# Patient Record
Sex: MALE | Race: WHITE | URBAN - METROPOLITAN AREA
[De-identification: names, ages, dates, MRNs, and addresses within clinical notes are randomized per-mention and may not be internally consistent; named-entity substitution may affect disease eponyms.]

---

## 2017-08-29 ENCOUNTER — IMPORTED ENCOUNTER (OUTPATIENT)
Dept: URBAN - METROPOLITAN AREA CLINIC 50 | Facility: CLINIC | Age: 67
End: 2017-08-29

## 2017-08-30 ENCOUNTER — IMPORTED ENCOUNTER (OUTPATIENT)
Dept: URBAN - METROPOLITAN AREA CLINIC 50 | Facility: CLINIC | Age: 67
End: 2017-08-30

## 2017-08-30 NOTE — PATIENT DISCUSSION
"""Annual Type 2 Diabetic eye exam with dilation. Mild diabetic retinopathy found. Bilateral macular edema is not present. Recommend annual dilated examinations. Patient instructed to call office immediately if sudden changes in vision occur. Emphasized importance of good blood glucose control. Summary of care provided to the physician managing the ongoing diabetes care. """

## 2017-10-09 ENCOUNTER — IMPORTED ENCOUNTER (OUTPATIENT)
Dept: URBAN - METROPOLITAN AREA CLINIC 50 | Facility: CLINIC | Age: 67
End: 2017-10-09

## 2017-10-18 ENCOUNTER — IMPORTED ENCOUNTER (OUTPATIENT)
Dept: URBAN - METROPOLITAN AREA CLINIC 50 | Facility: CLINIC | Age: 67
End: 2017-10-18

## 2017-11-08 ENCOUNTER — IMPORTED ENCOUNTER (OUTPATIENT)
Dept: URBAN - METROPOLITAN AREA CLINIC 50 | Facility: CLINIC | Age: 67
End: 2017-11-08

## 2017-11-08 NOTE — PATIENT DISCUSSION
"""Recommend cataract extraction with intraocular lens (IOL) OD.  Discussed the RBAs mentioned the loss part/all of vision with surgery answered patient questions and offered patient copy of consent form."" ""Phaco with IOL OD: 11/28/2017 Sensar AAB00 +22.50 Tri-Moxi

## 2017-11-27 ENCOUNTER — IMPORTED ENCOUNTER (OUTPATIENT)
Dept: URBAN - METROPOLITAN AREA CLINIC 50 | Facility: CLINIC | Age: 67
End: 2017-11-27

## 2017-12-06 ENCOUNTER — IMPORTED ENCOUNTER (OUTPATIENT)
Dept: URBAN - METROPOLITAN AREA CLINIC 50 | Facility: CLINIC | Age: 67
End: 2017-12-06

## 2017-12-13 ENCOUNTER — IMPORTED ENCOUNTER (OUTPATIENT)
Dept: URBAN - METROPOLITAN AREA CLINIC 50 | Facility: CLINIC | Age: 67
End: 2017-12-13

## 2017-12-20 ENCOUNTER — IMPORTED ENCOUNTER (OUTPATIENT)
Dept: URBAN - METROPOLITAN AREA CLINIC 50 | Facility: CLINIC | Age: 67
End: 2017-12-20

## 2018-01-17 ENCOUNTER — IMPORTED ENCOUNTER (OUTPATIENT)
Dept: URBAN - METROPOLITAN AREA CLINIC 50 | Facility: CLINIC | Age: 68
End: 2018-01-17

## 2018-04-23 NOTE — PROCEDURE NOTE: CLINICAL
PROCEDURE NOTE: Excision Chalazion, Single Right Upper Lid. Diagnosis: Chalazion. Prior to treatment, the risks/benefits/alternatives were discussed. The patient wished to proceed with procedure. After the risks, benefits, and alternatives to the procedure were discussed with the patient, informed consent was obtained. The patient, the procedure, and the correct site were identified beforehand. Local anesthesia was applied and the lid was prepped. The lid was clamped exposing the posterior surface on the eyelid. The chalazion was incised and removed with a curette. Bleeding was controlled and the clamp was removed. The patient tolerated the procedure well and left the room in good condition. Post procedure instructions given. Shabana Pollen PROCEDURE NOTE: Chalazion Injection Right Upper Lid. Diagnosis: Chalazion. Anesthesia: Local. Prep: Betadine Flush. Prior to treatment, the risks/benefits/alternatives were discussed. The patient wished to proceed with procedure. Site of Injection: *. Kenalog *units injected. Patient tolerated procedure well. There were no complications. Post procedure instructions given. Shabana Pollen

## 2018-11-07 ENCOUNTER — IMPORTED ENCOUNTER (OUTPATIENT)
Dept: URBAN - METROPOLITAN AREA CLINIC 50 | Facility: CLINIC | Age: 68
End: 2018-11-07

## 2019-03-21 NOTE — PATIENT DISCUSSION
Recommended warm compresses using sustained moist heat for at least 10 minutes a few times daily.  Continue Tobradex gtts TID OU x 1 more week then stop.  Cont Chon mavis prn. Start Doxy 100 mg PO BID x 2 weeks. If not improved on follow up, recommend I&D.

## 2019-03-21 NOTE — PATIENT DISCUSSION
Discussed the use of warm compresses using sustained moist heat as often as possible.  Continue Tobradex gtts TID for 1 more week then stop. Stop Chon Darrius.  Discussed use of high quality fish oils like Nordic Naturals or PRN, 2 grams daily to help normalize miebomian glands which will help with dry eyes as well as getting chronic chalazia.   Can also try Blephadex or SteriLid or Ocusoft lid cleansers daily.  Doxycycline used in past, recommend restart Doxy 100mg po BID x 2weeks. Follow up 1 month, s/p Kenalog injection with KK sooner prn.

## 2019-03-21 NOTE — PATIENT DISCUSSION
Kenalog injection recommended. Discussed with patient steroid injection may leave white steroid deposit visibly on eyelid, will resolve with time.

## 2019-03-21 NOTE — PROCEDURE NOTE: CLINICAL
PROCEDURE NOTE: Chalazion Injection Right Upper Lid. Diagnosis: Chalazion. Prior to treatment, the risks/benefits/alternatives were discussed. The patient wished to proceed with procedure. Site of Injection: *. Kenalog *units injected. Patient tolerated procedure well. There were no complications. Post procedure instructions given. Lalitae Rea PROCEDURE NOTE: Chalazion Injection Left Lower Lid. Diagnosis: Chalazion. Prior to treatment, the risks/benefits/alternatives were discussed. The patient wished to proceed with procedure. Site of Injection: *. Kenalog *units injected. Patient tolerated procedure well. There were no complications. Post procedure instructions given. Teresa Lucia

## 2019-04-18 NOTE — PATIENT DISCUSSION
Recommend biopsy of eyelid margin in future if redness does not fully resolve . Patient defers treatment today, would like to monitor.

## 2019-04-18 NOTE — PROCEDURE NOTE: CLINICAL
PROCEDURE NOTE: Chalazion Injection Left Lower Lid. Diagnosis: Chalazion. Prior to treatment, the risks/benefits/alternatives were discussed. The patient wished to proceed with procedure. Site of Injection: *. Kenalog *units injected. Patient tolerated procedure well. There were no complications. Post procedure instructions given. Yris Horowitz

## 2019-05-07 NOTE — PROCEDURE NOTE: CLINICAL
PROCEDURE NOTE: Excision Chalazion, Single Left Lower Lid. Diagnosis: Chalazion. Prior to treatment, the risks/benefits/alternatives were discussed. The patient wished to proceed with procedure. After the risks, benefits, and alternatives to the procedure were discussed with the patient, informed consent was obtained. The patient, the procedure, and the correct site were identified beforehand. Local anesthesia was applied and the lid was prepped. The lid was clamped exposing the posterior surface on the eyelid. The chalazion was incised and removed with a curette. Bleeding was controlled and the clamp was removed. The patient tolerated the procedure well and left the room in good condition. Post procedure instructions given. Jack Ortiz

## 2019-05-07 NOTE — PATIENT DISCUSSION
Patient desires excision of chalazion today.  Continue 2 grams of Nordic Naturals daily, may take up to 10 weeks to notice a difference.  Recommend using a reheatable warm moist compress for at least 10 minutes everyday to help liquify the oil glands and to decrease  recurrence of chalazia; followed by a  GENTLE lid massage along eyelash margins, with eyes closed.  No scrubbing.    Recommend stop baby shampoo.  Use blephadex or steri lid cleansers, which are gentle and made specifically for  our eyes.  Maxitrol drops OS QID x  1 wk then stop.  Chon  mavis qhs OS x 2 wks then stop. Follow up prn.

## 2019-05-07 NOTE — PATIENT DISCUSSION
Bump is still pink, has decreased greatly in size. Still has white appearance on the lid margin- pt states this is from a biopsy done years ago.  No treatment recommended today.

## 2019-05-09 NOTE — PATIENT DISCUSSION
Recommend  at least 2cc's of Voluma  to start (discussed risks and benefits. .. ).   1 syringe is $800.00,  if used one per side it would be $700.00.    May need  a good amount of product to get the lift she desires.  Discussed face lift would be a permanent and better option.  Patient  is not interested.  Discussed conservative approach today due to festoon left side d/t recent chalazion.  Patient defers treatment due to costs.  [place] will [place] her the $100.00 consult fee she paid.  Follow up prn.

## 2019-05-09 NOTE — PATIENT DISCUSSION
Recommended at least 1-2 syringes of restylane/juvederm to start.   Patient defers treatment today due to cost.

## 2019-11-12 NOTE — PATIENT DISCUSSION
Bump is still pink, has decreased greatly in size. Still has white appearance on the lid margin- pt states this is from a biopsy done years ago.  No treatment recommended today.
Continue current management.
Continue maxitrol drops qid x 1 week.
Discussed Restasis/Xiidra/Plugs.
Discussed condition and exacerbating conditions/situations (e.g., dry/arid environments, overhead fans, air conditioners, side effect of medications).
Discussed lid hygiene, warm compress and eyelid wash.
Discussed the use of warm compresses.
Doxyclycline PO.
I&D and kenalog injection performed today. Pt tolerated procedure well.
If chalazion worsens, patient advised to return to clinic.
Monitor.
PCO symptoms reviewed: blur, glare, difficulty with reading, driving, ADL.
Patient desires excision of chalazion today.  Continue 2 grams of Nordic Naturals daily, may take up to 10 weeks to notice a difference.  Recommend using a reheatable warm moist compress for at least 10 minutes everyday to help liquify the oil glands and to decrease  recurrence of chalazia; followed by a  GENTLE lid massage along eyelash margins, with eyes closed.  No scrubbing.    Recommend stop baby shampoo.  Use blephadex or steri lid cleansers, which are gentle and made specifically for  our eyes.  Maxitrol drops OS QID x  1 wk then stop.  Chon  mavis qhs OS x 2 wks then stop. Follow up prn.
Patient tolerated procedure well.
Recommended artificial tears to use: 1 drop 4x a day in both eyes.
Recommended at least 1-2 syringes of restylane/juvederm to start.   Patient defers treatment today due to cost.
Recommend  at least 2cc's of Voluma  to start (discussed risks and benefits. .. ).   1 syringe is $800.00,  if used one per side it would be $700.00.    May need  a good amount of product to get the lift she desires.  Discussed face lift would be a permanent and better option.  Patient  is not interested.  Discussed conservative approach today due to festoon left side d/t recent chalazion.  Patient defers treatment due to costs.  [place]   [place] will refund her the $100.00 consult fee she paid.  Follow up prn.
Surgical vs nonsurgical treatment options were discussed.
The patient elects to proceed with surgical excision.
Tobradex tid OD.
Risks/benefits discussed with patient/surrogate

## 2020-09-09 ENCOUNTER — OFFICE (OUTPATIENT)
Dept: URBAN - METROPOLITAN AREA CLINIC 12 | Facility: CLINIC | Age: 70
End: 2020-09-09

## 2020-09-09 VITALS
WEIGHT: 142 LBS | HEART RATE: 63 BPM | SYSTOLIC BLOOD PRESSURE: 122 MMHG | DIASTOLIC BLOOD PRESSURE: 63 MMHG | HEIGHT: 68 IN | OXYGEN SATURATION: 96 %

## 2020-09-09 DIAGNOSIS — Z12.11 ENCOUNTER FOR SCREENING FOR MALIGNANT NEOPLASM OF COLON: ICD-10-CM

## 2020-09-09 RX ORDER — POLYETHYLENE GLYCOL 3350, SODIUM SULFATE, SODIUM CHLORIDE, POTASSIUM CHLORIDE, ASCORBIC ACID, SODIUM ASCORBATE 140-9-5.2G
KIT ORAL
Qty: 1 | Refills: 0 | Status: COMPLETED
Start: 2020-09-09 | End: 2020-09-16

## 2020-09-16 ENCOUNTER — AMBULATORY SURGICAL CENTER (OUTPATIENT)
Dept: URBAN - METROPOLITAN AREA SURGERY 2 | Facility: SURGERY | Age: 70
End: 2020-09-16

## 2020-09-16 ENCOUNTER — AMBULATORY SURGICAL CENTER (OUTPATIENT)
Dept: URBAN - METROPOLITAN AREA SURGERY 2 | Facility: SURGERY | Age: 70
End: 2020-09-16
Payer: MEDICARE

## 2020-09-16 ENCOUNTER — OFFICE (OUTPATIENT)
Dept: URBAN - METROPOLITAN AREA PATHOLOGY 22 | Facility: PATHOLOGY | Age: 70
End: 2020-09-16

## 2020-09-16 VITALS
TEMPERATURE: 98.3 F | HEART RATE: 75 BPM | SYSTOLIC BLOOD PRESSURE: 111 MMHG | TEMPERATURE: 99.3 F | HEART RATE: 75 BPM | SYSTOLIC BLOOD PRESSURE: 86 MMHG | HEART RATE: 94 BPM | DIASTOLIC BLOOD PRESSURE: 62 MMHG | HEART RATE: 77 BPM | SYSTOLIC BLOOD PRESSURE: 84 MMHG | WEIGHT: 145 LBS | SYSTOLIC BLOOD PRESSURE: 131 MMHG | HEIGHT: 68 IN | HEART RATE: 94 BPM | HEART RATE: 94 BPM | SYSTOLIC BLOOD PRESSURE: 84 MMHG | WEIGHT: 145 LBS | DIASTOLIC BLOOD PRESSURE: 58 MMHG | DIASTOLIC BLOOD PRESSURE: 49 MMHG | SYSTOLIC BLOOD PRESSURE: 111 MMHG | OXYGEN SATURATION: 100 % | RESPIRATION RATE: 16 BRPM | TEMPERATURE: 98.3 F | RESPIRATION RATE: 16 BRPM | HEART RATE: 63 BPM | DIASTOLIC BLOOD PRESSURE: 62 MMHG | SYSTOLIC BLOOD PRESSURE: 131 MMHG | DIASTOLIC BLOOD PRESSURE: 49 MMHG | DIASTOLIC BLOOD PRESSURE: 76 MMHG | SYSTOLIC BLOOD PRESSURE: 84 MMHG | HEART RATE: 75 BPM | OXYGEN SATURATION: 100 % | OXYGEN SATURATION: 99 % | DIASTOLIC BLOOD PRESSURE: 76 MMHG | OXYGEN SATURATION: 99 % | HEART RATE: 63 BPM | HEART RATE: 77 BPM | OXYGEN SATURATION: 100 % | HEIGHT: 68 IN | SYSTOLIC BLOOD PRESSURE: 131 MMHG | TEMPERATURE: 99.3 F | OXYGEN SATURATION: 99 % | WEIGHT: 145 LBS | RESPIRATION RATE: 16 BRPM | SYSTOLIC BLOOD PRESSURE: 111 MMHG | HEART RATE: 77 BPM | HEART RATE: 63 BPM | TEMPERATURE: 99.3 F | DIASTOLIC BLOOD PRESSURE: 49 MMHG | SYSTOLIC BLOOD PRESSURE: 86 MMHG | HEIGHT: 68 IN | DIASTOLIC BLOOD PRESSURE: 58 MMHG | DIASTOLIC BLOOD PRESSURE: 76 MMHG | SYSTOLIC BLOOD PRESSURE: 86 MMHG | TEMPERATURE: 98.3 F | DIASTOLIC BLOOD PRESSURE: 58 MMHG | DIASTOLIC BLOOD PRESSURE: 62 MMHG

## 2020-09-16 DIAGNOSIS — Z12.11 ENCOUNTER FOR SCREENING FOR MALIGNANT NEOPLASM OF COLON: ICD-10-CM

## 2020-09-16 DIAGNOSIS — K57.30 DIVERTICULOSIS OF LARGE INTESTINE WITHOUT PERFORATION OR ABS: ICD-10-CM

## 2020-09-16 DIAGNOSIS — D12.3 BENIGN NEOPLASM OF TRANSVERSE COLON: ICD-10-CM

## 2020-09-16 PROBLEM — K63.5 POLYP OF COLON: Status: ACTIVE | Noted: 2020-09-16

## 2020-09-16 PROCEDURE — G8918 PT W/O PREOP ORDER IV AB PRO: HCPCS | Performed by: INTERNAL MEDICINE

## 2020-09-16 PROCEDURE — 45380 COLONOSCOPY AND BIOPSY: CPT | Mod: PT | Performed by: INTERNAL MEDICINE

## 2020-09-16 PROCEDURE — 88305 TISSUE EXAM BY PATHOLOGIST: CPT | Performed by: INTERNAL MEDICINE

## 2020-09-16 PROCEDURE — G8907 PT DOC NO EVENTS ON DISCHARG: HCPCS | Performed by: INTERNAL MEDICINE

## 2021-04-18 ASSESSMENT — VISUAL ACUITY
OD_BAT: >20/400
OD_OTHER: >20/400.
OS_BAT: 20/70
OS_SC: 20/50-2+2
OS_SC: 20/40+1
OD_OTHER: >20/400.
OD_BAT: >20/400
OS_CC: 20/50
OS_CC: 20/40
OD_SC: 20/30-2
OS_SC: 20/40
OD_SC: 20/40+2
OD_SC: 20/50-1
OD_CC: 20/50-1
OS_BAT: 20/70
OS_OTHER: 20/70. 20/200.
OD_CC: 20/60+2
OS_OTHER: 20/70. 20/70.
OS_OTHER: 20/70. 20/70.
OD_SC: 20/30-2
OS_PH: 20/40
OS_PH: 20/30-1
OS_CC: 20/50+1
OD_PH: 20/50
OS_BAT: 20/70

## 2021-04-18 ASSESSMENT — TONOMETRY
OD_IOP_MMHG: 19
OS_IOP_MMHG: 16
OD_IOP_MMHG: 16
OD_IOP_MMHG: 16
OD_IOP_MMHG: 28
OS_IOP_MMHG: 21
OS_IOP_MMHG: 20
OS_IOP_MMHG: 23
OS_IOP_MMHG: 19
OS_IOP_MMHG: 18
OD_IOP_MMHG: 21
OD_IOP_MMHG: 19

## 2021-04-21 NOTE — PROCEDURE NOTE: CLINICAL
PROCEDURE NOTE: Epilation #1 Left Upper Lid. Diagnosis: Trichiasis without Entropion. Anesthesia: Topical. Prior to treatment, the risks/benefits/alternatives were discussed. The patient wished to proceed with procedure. Aberrant lashes removed from * lid(s) using microforcep. Patient tolerated procedure well. There were no complications. Post-op instructions given. Lefty Chamberlain

## 2022-02-26 NOTE — PATIENT DISCUSSION
NEPHROLOGY PROGRESS NOTE      SUBJECTIVE     Follow-up of [facility] on hemodialysis. Tunneled catheter has been discontinued as renal function is improved. Urine output excellent. On p.o. Bumex. Lower extremity swelling improving. Overall feels fine. Reported no chest pain shortness of breath. Renal function stable. Last creatinine 2.17      OBJECTIVE     Vitals:    02/25/22 2005 02/26/22 0528 02/26/22 0830 02/26/22 1221   BP: (!) 148/66  (!) 164/80 (!) 151/60   Pulse: 81  80 76   Resp: 20  16 18   Temp: 98.5 °F (36.9 °C)  97.3 °F (36.3 °C) 98.4 °F (36.9 °C)   TempSrc: Oral  Oral Oral   SpO2: 97%  97% 97%   Weight:  264 lb 11.2 oz (120.1 kg)     Height:         24HR INTAKE/OUTPUT:    No intake or output data in the 24 hours ending 02/26/22 1517    General appearance:Awake, alert, in no acute distress  HEENT: PERRLA  Respiratory::vesicular breath sounds,no wheeze/crackles  Cardiovascular:S1 S2 normal,no gallop or organic murmur. Abdomen:Non tender/non distended. Bowel sounds present  Extremities: No Cyanosis or Clubbing, present lower extremity edema and improving  Neurological:Alert and oriented. No abnormalities of mood, affect, memory, mentation, or behavior are noted      MEDICATIONS     Scheduled Meds:    [START ON 2/27/2022] bumetanide  1 mg Oral Daily    docusate sodium  100 mg Oral Daily    famotidine  20 mg Oral Daily    carvedilol  12.5 mg Oral BID    amLODIPine  10 mg Oral Daily    lisinopril  40 mg Oral Daily    DULoxetine  60 mg Oral Daily    insulin glargine  30 Units SubCUTAneous BID    fluticasone  1 spray Each Nostril Daily    budesonide-formoterol  2 puff Inhalation BID    montelukast  10 mg Oral Daily    rOPINIRole  4 mg Oral Daily    heparin (porcine)  5,000 Units SubCUTAneous 3 times per day    acetaminophen  1,000 mg Oral 3 times per day     Continuous Infusions:     PRN Meds:  heparin (porcine), heparin (porcine), ondansetron, hydrALAZINE, melatonin, albumin human, albuterol Tobradex gtts qid OD for 5ml. sulfate HFA  Home Meds:                Medications Prior to Admission: cyanocobalamin 1000 MCG/ML injection, Inject 1,000 mcg into the muscle every 30 days  fluticasone-salmeterol (ADVAIR HFA) 45-21 MCG/ACT inhaler, Inhale 2 puffs into the lungs 2 times daily  Multiple Vitamins-Minerals (MULTIVITAMIN ADULT EXTRA C PO), Take 1 tablet by mouth daily  vitamin D (ERGOCALCIFEROL) 1.25 MG (08539 UT) CAPS capsule, Take 50,000 Units by mouth once a week  albuterol sulfate HFA (PROAIR HFA) 108 (90 Base) MCG/ACT inhaler, Inhale 1 puff into the lungs as needed  vitamin D (CHOLECALCIFEROL) 125 MCG (5000 UT) CAPS capsule, Take 5,000 Units by mouth daily  DULoxetine (CYMBALTA) 60 MG extended release capsule, Take 60 mg by mouth daily  fenofibrate (TRIGLIDE) 160 MG tablet, Take 160 mg by mouth daily  fluticasone (FLONASE) 50 MCG/ACT nasal spray, 1 spray by Each Nostril route  glucagon 1 MG injection, Inject 1 mg into the muscle as needed  lisinopril (PRINIVIL;ZESTRIL) 40 MG tablet, Take 40 mg by mouth daily  montelukast (SINGULAIR) 10 MG tablet, Take 10 mg by mouth daily  omeprazole (PRILOSEC) 20 MG delayed release capsule, Take 20 mg by mouth daily  rOPINIRole (REQUIP) 4 MG tablet, Take 4 mg by mouth daily  [DISCONTINUED] PRENATAL VIT-DOCUSATE-IRON-FA PO, Take 1 tablet by mouth daily  [DISCONTINUED] carvedilol (COREG) 25 MG tablet, Take 25 mg by mouth daily    INVESTIGATIONS     Last 3 CMP:    Recent Labs     02/24/22  0806 02/25/22  0417 02/26/22  0633    141 141   K 4.7 4.1 4.3   CL 96* 100 102   CO2 27 30 26   BUN 19 20 22*   CREATININE 2.18* 2.45* 2.17*   CALCIUM 9.0 8.7 8.5*       Last 3 CBC:  No results for input(s): WBC, RBC, HGB, HCT, MCV, MCH, MCHC, RDW, PLT, MPV in the last 72 hours. ASSESSMENT     #1 acute kidney injury nonoliguric secondary to ischemic (shock/infection) and nephrotoxic (Vanco) ATN - HD.   Needed hemodialysis for few sessions with eventual recovery  #2 chronic kidney disease stage III secondary diabetic nephrosclerosis with baseline creatinine 1.6-1.7  #3 Fourniers gangrene S/p debridement  #4 type 2 diabetes  #5 obesity    PLAN     #1 reduce Bumex dose on discharge  #2 continue other antihypertensive medication as per primary  #3 outpatient BMP next week Monday and results to be faxed to [phone]  #4 outpatient follow-up in 4 to 6 weeks time.   Call [phone] for appointment    Please do not hesitate to call with questions    This note is created with the assistance of a speech-recognition program. While intending to generate a document that actually reflects the content of the visit, no guarantees can be provided that every mistake has been identified and corrected by editing    Serene Ovalle MD MD, [facility] Shannon), [address]   2/26/2022 3:17 PM  [facility]

## 2022-07-14 NOTE — PROCEDURE NOTE: CLINICAL
PROCEDURE NOTE: Punctal Plugs, Silicone #2 Bilateral Lower Lids. Diagnosis: Sjögren's Syndrome. Anesthesia: Topical. Prep: Antibiotic Drops q 5min x 3. Prior to treatment, the risks/benefits/alternatives were discussed. The patient wished to proceed with procedure. Permanent silicone plugs were inserted. Patient tolerated procedure well. There were no complications. Post procedure instructions given. Size *. Angelia George Please contact Debby and let her know that she has a small avulsion fracture in the left 3rd digit.  I would recommend that she go to the [facility] urgent care because it looks like it does involve the joint and she should consult with them.    I will also place a referral to hand surgeon, but [facility] urgent care will likely be the fastest.    Thanks,    Volodymyr Herring MD

## 2022-12-07 NOTE — PATIENT DISCUSSION
"""S/P IOL OU: OD: Sensar AAB00 +22.50 +TMOmidria.  OS: Sensar "" Please help patient schedule an appt with an TED for med refills.  Thank you.  Karin Goodman LPN  Urology Clinic Service   [facility]